# Patient Record
(demographics unavailable — no encounter records)

---

## 2024-10-30 NOTE — HISTORY OF PRESENT ILLNESS
[FreeTextEntry1] : 44  years old  female patient referred for cardiac evaluation for abnormal EKG. today she is here for cardiac  follow-up   She does get short of breath on more than usual exertion.  She denies PND, orthopnea, diaphoresis, dizziness,   edema.  Now she had 3 different episodes of palpitations described as rapid heartbeats lasting less than a minute and she is very much concerned given her family history  No new symptoms.  March 11, 2024 Zio patch was done for palpitations -heart rate ranged from 41 to 127 bpm with average 72 bpm.  She had few PACs, few PACs and she had SVT longest was 7 beats; patient has improved January 14, 2022 echocardiogram showed normal size, LV thickness, wall motion, LVEF 60 to 65%, mild tricuspid regurg, normal pulm artery pressure July 16, 2023 stress test was done using Sid protocol; patient exercised for 4 minutes with peak heart rate 160 bpm, peak blood pressure 148/66 mmHg, no new symptoms, no acute changes, negative for exercise-induced ischemia, exercise Duke treadmill score more     No prior history of CHF, MI, syncope.  She had denies history of diabetes, hypertension, dyslipidemia she continues smoke cigarettes.  She does not eat red meat due to alpha gal.

## 2024-10-30 NOTE — DISCUSSION/SUMMARY
[FreeTextEntry1] : Abnormal EKG with smoking and-chest pain -  echo confirmed normal LV size, LV thickness, motion, LV and valvular morphology , ruled out ASD/PFO as patient has right exudation and findings suggestive of RVH on EKG. stress test negative for inducible ischemia but has decreased functional capacity,  reassurance at this point.  It was made very clear to that her functional capacity decreased.  She has been advised to discontinue smoking.  She has been advised to walk daily  Palpitations -Zio patch for 7 days confirmed normal sinus rhythm without A-fib, NSVT, need for pacemaker; she had PACs sometimes sequential avoid caffeine products.  Toprol-XL 25 mg daily.  She has improved symptomatically.  She will continue Toprol  Risk factor modification has been discussed with her at great length.  No new intervention needed.  She will be reeval by me in 6 months after lipid panel.